# Patient Record
Sex: MALE | Race: WHITE | Employment: OTHER | URBAN - METROPOLITAN AREA
[De-identification: names, ages, dates, MRNs, and addresses within clinical notes are randomized per-mention and may not be internally consistent; named-entity substitution may affect disease eponyms.]

---

## 2019-05-27 ENCOUNTER — APPOINTMENT (OUTPATIENT)
Dept: GENERAL RADIOLOGY | Age: 69
End: 2019-05-27
Payer: MEDICARE

## 2019-05-27 ENCOUNTER — APPOINTMENT (OUTPATIENT)
Dept: CT IMAGING | Age: 69
End: 2019-05-27
Payer: MEDICARE

## 2019-05-27 ENCOUNTER — HOSPITAL ENCOUNTER (OUTPATIENT)
Age: 69
Setting detail: OBSERVATION
Discharge: HOME | End: 2019-05-28
Attending: STUDENT IN AN ORGANIZED HEALTH CARE EDUCATION/TRAINING PROGRAM | Admitting: INTERNAL MEDICINE
Payer: MEDICARE

## 2019-05-27 DIAGNOSIS — S30.0XXA CONTUSION OF BUTTOCK, INITIAL ENCOUNTER: ICD-10-CM

## 2019-05-27 DIAGNOSIS — J93.9 PNEUMOTHORAX ON RIGHT: Primary | ICD-10-CM

## 2019-05-27 DIAGNOSIS — S22.31XA CLOSED FRACTURE OF ONE RIB OF RIGHT SIDE, INITIAL ENCOUNTER: ICD-10-CM

## 2019-05-27 DIAGNOSIS — S22.41XA CLOSED FRACTURE OF MULTIPLE RIBS OF RIGHT SIDE, INITIAL ENCOUNTER: ICD-10-CM

## 2019-05-27 PROBLEM — I10 ESSENTIAL HYPERTENSION: Status: ACTIVE | Noted: 2019-05-27

## 2019-05-27 PROBLEM — E11.9 TYPE 2 DIABETES MELLITUS, WITHOUT LONG-TERM CURRENT USE OF INSULIN (HCC): Status: ACTIVE | Noted: 2019-05-27

## 2019-05-27 LAB
ABO/RH: NORMAL
ALBUMIN SERPL-MCNC: 4.4 G/DL (ref 3.5–4.6)
ALP BLD-CCNC: 79 U/L (ref 35–104)
ALT SERPL-CCNC: 98 U/L (ref 0–41)
AMPHETAMINE SCREEN, URINE: ABNORMAL
ANION GAP SERPL CALCULATED.3IONS-SCNC: 18 MEQ/L (ref 9–15)
ANISOCYTOSIS: ABNORMAL
ANTIBODY SCREEN: NORMAL
APTT: 19.7 SEC (ref 21.6–35.4)
AST SERPL-CCNC: 145 U/L (ref 0–40)
ATYPICAL LYMPHOCYTE RELATIVE PERCENT: 5 %
BACTERIA: NEGATIVE /HPF
BARBITURATE SCREEN URINE: ABNORMAL
BASOPHILS ABSOLUTE: 0 K/UL (ref 0–0.2)
BASOPHILS RELATIVE PERCENT: 0.8 %
BENZODIAZEPINE SCREEN, URINE: ABNORMAL
BILIRUB SERPL-MCNC: 1.3 MG/DL (ref 0.2–0.7)
BILIRUBIN URINE: NEGATIVE
BLOOD, URINE: ABNORMAL
BUN BLDV-MCNC: 18 MG/DL (ref 8–23)
CALCIUM SERPL-MCNC: 9 MG/DL (ref 8.5–9.9)
CANNABINOID SCREEN URINE: POSITIVE
CASTS: ABNORMAL /LPF
CHLORIDE BLD-SCNC: 100 MEQ/L (ref 95–107)
CLARITY: CLEAR
CO2: 24 MEQ/L (ref 20–31)
COCAINE METABOLITE SCREEN URINE: ABNORMAL
COLOR: ABNORMAL
CREAT SERPL-MCNC: 0.95 MG/DL (ref 0.7–1.2)
EKG ATRIAL RATE: 60 BPM
EKG P AXIS: -15 DEGREES
EKG P-R INTERVAL: 162 MS
EKG Q-T INTERVAL: 430 MS
EKG QRS DURATION: 104 MS
EKG QTC CALCULATION (BAZETT): 430 MS
EKG R AXIS: -6 DEGREES
EKG T AXIS: 30 DEGREES
EKG VENTRICULAR RATE: 60 BPM
EOSINOPHILS ABSOLUTE: 0.2 K/UL (ref 0–0.7)
EOSINOPHILS RELATIVE PERCENT: 2 %
EPITHELIAL CELLS, UA: ABNORMAL /HPF (ref 0–5)
ETHANOL PERCENT: NORMAL G/DL
ETHANOL: <10 MG/DL (ref 0–0.08)
GFR AFRICAN AMERICAN: >60
GFR NON-AFRICAN AMERICAN: >60
GLOBULIN: 2.3 G/DL (ref 2.3–3.5)
GLUCOSE BLD-MCNC: 148 MG/DL (ref 70–99)
GLUCOSE BLD-MCNC: 190 MG/DL (ref 60–115)
GLUCOSE URINE: NEGATIVE MG/DL
HCT VFR BLD CALC: 44.6 % (ref 42–52)
HEMOGLOBIN: 16.5 G/DL (ref 14–18)
INR BLD: 1.1
KETONES, URINE: ABNORMAL MG/DL
LEUKOCYTE ESTERASE, URINE: NEGATIVE
LYMPHOCYTES ABSOLUTE: 4 K/UL (ref 1–4.8)
LYMPHOCYTES RELATIVE PERCENT: 38 %
Lab: ABNORMAL
MCH RBC QN AUTO: 35.5 PG (ref 27–31.3)
MCHC RBC AUTO-ENTMCNC: 36.9 % (ref 33–37)
MCV RBC AUTO: 96.3 FL (ref 80–100)
MONOCYTES ABSOLUTE: 0.4 K/UL (ref 0.2–0.8)
MONOCYTES RELATIVE PERCENT: 3.7 %
NEUTROPHILS ABSOLUTE: 4.7 K/UL (ref 1.4–6.5)
NEUTROPHILS RELATIVE PERCENT: 51 %
NITRITE, URINE: NEGATIVE
OPIATE SCREEN URINE: POSITIVE
PDW BLD-RTO: 12.8 % (ref 11.5–14.5)
PERFORMED ON: ABNORMAL
PH UA: 5 (ref 5–9)
PHENCYCLIDINE SCREEN URINE: ABNORMAL
PLATELET # BLD: 295 K/UL (ref 130–400)
PLATELET SLIDE REVIEW: ADEQUATE
POC CREATININE WHOLE BLOOD: 1
POTASSIUM SERPL-SCNC: 3.7 MEQ/L (ref 3.4–4.9)
PROTEIN UA: ABNORMAL MG/DL
PROTHROMBIN TIME: 10.8 SEC (ref 9–11.5)
RBC # BLD: 4.63 M/UL (ref 4.7–6.1)
RBC UA: ABNORMAL /HPF (ref 0–5)
SLIDE REVIEW: ABNORMAL
SMUDGE CELLS: 1.9
SODIUM BLD-SCNC: 142 MEQ/L (ref 135–144)
SPECIFIC GRAVITY UA: 1.04 (ref 1–1.03)
TOTAL PROTEIN: 6.7 G/DL (ref 6.3–8)
URINE REFLEX TO CULTURE: YES
UROBILINOGEN, URINE: 1 E.U./DL
WBC # BLD: 9.3 K/UL (ref 4.8–10.8)
WBC UA: ABNORMAL /HPF (ref 0–5)

## 2019-05-27 PROCEDURE — 72131 CT LUMBAR SPINE W/O DYE: CPT

## 2019-05-27 PROCEDURE — 80053 COMPREHEN METABOLIC PANEL: CPT

## 2019-05-27 PROCEDURE — 87086 URINE CULTURE/COLONY COUNT: CPT

## 2019-05-27 PROCEDURE — 86900 BLOOD TYPING SEROLOGIC ABO: CPT

## 2019-05-27 PROCEDURE — 85610 PROTHROMBIN TIME: CPT

## 2019-05-27 PROCEDURE — 81001 URINALYSIS AUTO W/SCOPE: CPT

## 2019-05-27 PROCEDURE — 74177 CT ABD & PELVIS W/CONTRAST: CPT

## 2019-05-27 PROCEDURE — 71045 X-RAY EXAM CHEST 1 VIEW: CPT

## 2019-05-27 PROCEDURE — 6360000002 HC RX W HCPCS: Performed by: INTERNAL MEDICINE

## 2019-05-27 PROCEDURE — 80307 DRUG TEST PRSMV CHEM ANLYZR: CPT

## 2019-05-27 PROCEDURE — G0480 DRUG TEST DEF 1-7 CLASSES: HCPCS

## 2019-05-27 PROCEDURE — 70450 CT HEAD/BRAIN W/O DYE: CPT

## 2019-05-27 PROCEDURE — 6360000002 HC RX W HCPCS: Performed by: PHYSICIAN ASSISTANT

## 2019-05-27 PROCEDURE — 72170 X-RAY EXAM OF PELVIS: CPT

## 2019-05-27 PROCEDURE — G0378 HOSPITAL OBSERVATION PER HR: HCPCS

## 2019-05-27 PROCEDURE — 72125 CT NECK SPINE W/O DYE: CPT

## 2019-05-27 PROCEDURE — 86901 BLOOD TYPING SEROLOGIC RH(D): CPT

## 2019-05-27 PROCEDURE — 96376 TX/PRO/DX INJ SAME DRUG ADON: CPT

## 2019-05-27 PROCEDURE — 85730 THROMBOPLASTIN TIME PARTIAL: CPT

## 2019-05-27 PROCEDURE — 96375 TX/PRO/DX INJ NEW DRUG ADDON: CPT

## 2019-05-27 PROCEDURE — 6370000000 HC RX 637 (ALT 250 FOR IP): Performed by: INTERNAL MEDICINE

## 2019-05-27 PROCEDURE — 2580000003 HC RX 258: Performed by: INTERNAL MEDICINE

## 2019-05-27 PROCEDURE — 6360000004 HC RX CONTRAST MEDICATION: Performed by: PHYSICIAN ASSISTANT

## 2019-05-27 PROCEDURE — 94150 VITAL CAPACITY TEST: CPT

## 2019-05-27 PROCEDURE — 86850 RBC ANTIBODY SCREEN: CPT

## 2019-05-27 PROCEDURE — 93005 ELECTROCARDIOGRAM TRACING: CPT | Performed by: STUDENT IN AN ORGANIZED HEALTH CARE EDUCATION/TRAINING PROGRAM

## 2019-05-27 PROCEDURE — 36415 COLL VENOUS BLD VENIPUNCTURE: CPT

## 2019-05-27 PROCEDURE — 85025 COMPLETE CBC W/AUTO DIFF WBC: CPT

## 2019-05-27 PROCEDURE — 71260 CT THORAX DX C+: CPT

## 2019-05-27 PROCEDURE — 99285 EMERGENCY DEPT VISIT HI MDM: CPT

## 2019-05-27 PROCEDURE — 96374 THER/PROPH/DIAG INJ IV PUSH: CPT

## 2019-05-27 RX ORDER — PANTOPRAZOLE SODIUM 40 MG/1
40 TABLET, DELAYED RELEASE ORAL DAILY
Status: DISCONTINUED | OUTPATIENT
Start: 2019-05-27 | End: 2019-05-28 | Stop reason: HOSPADM

## 2019-05-27 RX ORDER — FENTANYL CITRATE 50 UG/ML
50 INJECTION, SOLUTION INTRAMUSCULAR; INTRAVENOUS ONCE
Status: COMPLETED | OUTPATIENT
Start: 2019-05-27 | End: 2019-05-27

## 2019-05-27 RX ORDER — VALSARTAN 160 MG/1
80 TABLET ORAL DAILY
Status: DISCONTINUED | OUTPATIENT
Start: 2019-05-28 | End: 2019-05-28 | Stop reason: HOSPADM

## 2019-05-27 RX ORDER — HYDRALAZINE HYDROCHLORIDE 20 MG/ML
10 INJECTION INTRAMUSCULAR; INTRAVENOUS EVERY 4 HOURS PRN
Status: DISCONTINUED | OUTPATIENT
Start: 2019-05-27 | End: 2019-05-28 | Stop reason: HOSPADM

## 2019-05-27 RX ORDER — DEXTROSE MONOHYDRATE 50 MG/ML
100 INJECTION, SOLUTION INTRAVENOUS PRN
Status: DISCONTINUED | OUTPATIENT
Start: 2019-05-27 | End: 2019-05-28 | Stop reason: HOSPADM

## 2019-05-27 RX ORDER — SODIUM CHLORIDE 0.9 % (FLUSH) 0.9 %
10 SYRINGE (ML) INJECTION PRN
Status: DISCONTINUED | OUTPATIENT
Start: 2019-05-27 | End: 2019-05-28 | Stop reason: HOSPADM

## 2019-05-27 RX ORDER — NICOTINE POLACRILEX 4 MG
15 LOZENGE BUCCAL PRN
Status: DISCONTINUED | OUTPATIENT
Start: 2019-05-27 | End: 2019-05-28 | Stop reason: HOSPADM

## 2019-05-27 RX ORDER — SODIUM CHLORIDE 0.9 % (FLUSH) 0.9 %
10 SYRINGE (ML) INJECTION EVERY 12 HOURS SCHEDULED
Status: DISCONTINUED | OUTPATIENT
Start: 2019-05-27 | End: 2019-05-28 | Stop reason: HOSPADM

## 2019-05-27 RX ORDER — KETOROLAC TROMETHAMINE 30 MG/ML
30 INJECTION, SOLUTION INTRAMUSCULAR; INTRAVENOUS EVERY 6 HOURS
Status: DISCONTINUED | OUTPATIENT
Start: 2019-05-27 | End: 2019-05-28 | Stop reason: HOSPADM

## 2019-05-27 RX ORDER — ONDANSETRON 2 MG/ML
4 INJECTION INTRAMUSCULAR; INTRAVENOUS EVERY 6 HOURS PRN
Status: DISCONTINUED | OUTPATIENT
Start: 2019-05-27 | End: 2019-05-28 | Stop reason: HOSPADM

## 2019-05-27 RX ORDER — DEXTROSE MONOHYDRATE 25 G/50ML
12.5 INJECTION, SOLUTION INTRAVENOUS PRN
Status: DISCONTINUED | OUTPATIENT
Start: 2019-05-27 | End: 2019-05-28 | Stop reason: HOSPADM

## 2019-05-27 RX ORDER — ACETAMINOPHEN 325 MG/1
650 TABLET ORAL EVERY 6 HOURS SCHEDULED
Status: DISCONTINUED | OUTPATIENT
Start: 2019-05-27 | End: 2019-05-28 | Stop reason: HOSPADM

## 2019-05-27 RX ORDER — SODIUM CHLORIDE 9 MG/ML
INJECTION, SOLUTION INTRAVENOUS CONTINUOUS
Status: DISPENSED | OUTPATIENT
Start: 2019-05-27 | End: 2019-05-28

## 2019-05-27 RX ORDER — MORPHINE SULFATE 2 MG/ML
2 INJECTION, SOLUTION INTRAMUSCULAR; INTRAVENOUS EVERY 4 HOURS PRN
Status: DISCONTINUED | OUTPATIENT
Start: 2019-05-27 | End: 2019-05-28 | Stop reason: HOSPADM

## 2019-05-27 RX ORDER — ONDANSETRON 2 MG/ML
4 INJECTION INTRAMUSCULAR; INTRAVENOUS ONCE
Status: COMPLETED | OUTPATIENT
Start: 2019-05-27 | End: 2019-05-27

## 2019-05-27 RX ORDER — MORPHINE SULFATE 2 MG/ML
4 INJECTION, SOLUTION INTRAMUSCULAR; INTRAVENOUS ONCE
Status: COMPLETED | OUTPATIENT
Start: 2019-05-27 | End: 2019-05-27

## 2019-05-27 RX ORDER — OXYCODONE HYDROCHLORIDE AND ACETAMINOPHEN 5; 325 MG/1; MG/1
1 TABLET ORAL EVERY 6 HOURS PRN
Status: DISCONTINUED | OUTPATIENT
Start: 2019-05-27 | End: 2019-05-28 | Stop reason: HOSPADM

## 2019-05-27 RX ADMIN — KETOROLAC TROMETHAMINE 30 MG: 30 INJECTION, SOLUTION INTRAMUSCULAR at 19:37

## 2019-05-27 RX ADMIN — PANTOPRAZOLE SODIUM 40 MG: 40 TABLET, DELAYED RELEASE ORAL at 19:38

## 2019-05-27 RX ADMIN — SODIUM CHLORIDE: 9 INJECTION, SOLUTION INTRAVENOUS at 19:38

## 2019-05-27 RX ADMIN — IOPAMIDOL 100 ML: 755 INJECTION, SOLUTION INTRAVENOUS at 15:59

## 2019-05-27 RX ADMIN — Medication 10 ML: at 19:38

## 2019-05-27 RX ADMIN — ACETAMINOPHEN 650 MG: 325 TABLET ORAL at 22:09

## 2019-05-27 RX ADMIN — FENTANYL CITRATE 50 MCG: 50 INJECTION, SOLUTION INTRAMUSCULAR; INTRAVENOUS at 17:39

## 2019-05-27 RX ADMIN — ONDANSETRON 4 MG: 2 INJECTION INTRAMUSCULAR; INTRAVENOUS at 15:17

## 2019-05-27 RX ADMIN — MORPHINE SULFATE 4 MG: 2 INJECTION, SOLUTION INTRAMUSCULAR; INTRAVENOUS at 15:18

## 2019-05-27 ASSESSMENT — PAIN DESCRIPTION - LOCATION
LOCATION: BACK
LOCATION: BACK

## 2019-05-27 ASSESSMENT — ENCOUNTER SYMPTOMS
SHORTNESS OF BREATH: 0
SORE THROAT: 0
RHINORRHEA: 0
EYE DISCHARGE: 0
COLOR CHANGE: 0
CONSTIPATION: 0
ABDOMINAL PAIN: 0
ABDOMINAL DISTENTION: 0

## 2019-05-27 ASSESSMENT — PAIN DESCRIPTION - DESCRIPTORS
DESCRIPTORS: SHARP;STABBING
DESCRIPTORS: SHARP

## 2019-05-27 ASSESSMENT — PAIN SCALES - GENERAL
PAINLEVEL_OUTOF10: 9
PAINLEVEL_OUTOF10: 7
PAINLEVEL_OUTOF10: 5
PAINLEVEL_OUTOF10: 10

## 2019-05-27 ASSESSMENT — PAIN DESCRIPTION - PAIN TYPE
TYPE: ACUTE PAIN
TYPE: ACUTE PAIN

## 2019-05-27 ASSESSMENT — PAIN DESCRIPTION - ORIENTATION
ORIENTATION: RIGHT;POSTERIOR;MID
ORIENTATION: RIGHT;MID

## 2019-05-27 ASSESSMENT — PAIN DESCRIPTION - FREQUENCY: FREQUENCY: CONTINUOUS

## 2019-05-27 NOTE — ED NOTES
To CT/xray via cart. States pain is a little better since the Morphine. No acute distress noted at this time.      Fermín Lynch RN  05/27/19 0543

## 2019-05-27 NOTE — ED NOTES
Transport here. Pt states he's feeling better. No acute distress noted at this time. Tele removed after confirming that no tele was needed for the floor.       Kelsey Draper RN  05/27/19 1174

## 2019-05-27 NOTE — ED NOTES
Bed: 01  Expected date:   Expected time:   Means of arrival:   Comments:     Hedy Poole RN  05/27/19 5748

## 2019-05-27 NOTE — ED NOTES
Returned. Placed back onto monitor. States pain only with each breath to right side of back. States it feels like he can't relax. O2 2 liters intact.       Tyrone Bishop RN  05/27/19 1664

## 2019-05-27 NOTE — ED PROVIDER NOTES
[address] [facility] ED  eMERGENCY dEPARTMENT eNCOUnter      Pt Name: Alejandrina Galloway  MRN: [id number]  [place] 1950  Date of evaluation: 5/27/2019  Provider: Kel Edwards PA-C    CHIEF COMPLAINT       Chief Complaint   Patient presents with   Wannetta Parlor from 8-10 feet up when ladder slipped out from under him         HISTORY OF PRESENT ILLNESS   (Location/Symptom, Timing/Onset,Context/Setting, Quality, Duration, Modifying Factors, Severity)  Note limiting factors. Alejnadrina Galloway is a 76 y.o. male who presents to the emergency department with a complaint of right-sided lower chest wall pain neck pain secondary to a fall approximately 8 feet off of the ladder. Patient states ladder was leaned up against the wall he states he kicked out from underneath him he fell backwards and landed on the ladder injuring the right side of his chest wall. He states increasing chest pain with deep inspiration and some shortness of breath. Consciousness no nausea vomiting or dizziness. He rates his current pain at this time as a 10 out of 10 daily to his right side chest wall. He denies any pelvic pain he didn't denies any upper or lower extremity pain. Patient states past medical history for hypertension and diabetes mellitus E is not on any blood thinners according to patient. HPI    NursingNotes were reviewed. REVIEW OF SYSTEMS    (2-9 systems for level 4, 10 or more for level 5)     Review of Systems   Constitutional: Negative for activity change and appetite change. HENT: Negative for congestion, ear discharge, ear pain, nosebleeds, rhinorrhea and sore throat. Eyes: Negative for discharge. Respiratory: Negative for shortness of breath. Into the right lower posterior chest wall. Cardiovascular: Negative for chest pain, palpitations and leg swelling. Gastrointestinal: Negative for abdominal distention, abdominal pain and constipation.    Genitourinary: Negative for difficulty urinating and dysuria. Musculoskeletal: Positive for neck pain. Negative for arthralgias. Skin: Negative for color change, pallor, rash and wound. Neurological: Negative for dizziness, tremors, syncope, weakness, numbness and headaches. Psychiatric/Behavioral: Negative for agitation and confusion. Except as noted above the remainder of the review of systems was reviewed and negative. PAST MEDICAL HISTORY     Past Medical History:   Diagnosis Date    Acid reflux     Diabetes mellitus ([address]     Hypertension     Prostatitis          SURGICALHISTORY       Past Surgical History:   Procedure Laterality Date    CHOLECYSTECTOMY, LAPAROSCOPIC N/A 12/9/2016    CHOLECYSTECTOMY LAPAROSCOPIC WITH GRAMS POSS OPEN  performed by Cathie Payton MD at [address] [facility]      ganglion cyst removal of right wrist    TONSILLECTOMY           CURRENT MEDICATIONS       Previous Medications    ESOMEPRAZOLE MAGNESIUM (NEXIUM) 40 MG PACK    Take 40 mg by mouth daily    METFORMIN (GLUCOPHAGE) 500 MG TABLET    Take 500 mg by mouth daily (with breakfast)    VALSARTAN (DIOVAN) 80 MG TABLET    Take 80 mg by mouth daily       ALLERGIES     Patient has no known allergies. FAMILY HISTORY     History reviewed. No pertinent family history. SOCIAL HISTORY       Social History     Socioeconomic History    Marital status:      Spouse name: None    Number of children: None    Years of education: None    Highest education level: None   Occupational History    None   Social Needs    Financial resource strain: None    Food insecurity:     Worry: None     Inability: None    Transportation needs:     Medical: None     Non-medical: None   Tobacco Use    Smoking status: Light Tobacco Smoker     Types: Cigars    Smokeless tobacco: Never Used    Tobacco comment: occassional cigar   Substance and Sexual Activity    Alcohol use: Yes     Comment: Switches between a beer a day and an NA a day    Drug use:  Yes lobes left side. Minimally diminished lung sounds right lower lobe. Positive pain on palpation to the lateral aspect of the right chest wall in the area of the seventh or eighth ribs crepitus or deformity no depressions minor abrasion. Abdominal: Soft. Bowel sounds are normal. He exhibits no distension and no mass. There is no tenderness. There is no guarding. Abdomen soft nondistended nontender no guarding mass or rebound, no cuts scrapes or abrasions noted to abdomen   Musculoskeletal: He exhibits no edema or deformity. Pelvis is stable there is no crepitus or deformity no pain on palpation no shortening or rotation of either lower extremity no pain on palpation to right or left lower extremity moving all extremities upper and lower well without any difficulty. Neurological: He is alert and oriented to person, place, and time. Coordination normal.   Skin: Skin is warm. DIAGNOSTIC RESULTS     EKG: All EKG's are interpreted by the Emergency Department Physician who either signs or Co-signsthis chart in the absence of a cardiologist.    EKG shows normal sinus rhythm at 60 bpm there is T-wave inversions in leads 3 no other acute ST segment abnormality no ventricular ectopy QT is 430 ms    RADIOLOGY:   Non-plain filmimages such as CT, Ultrasound and MRI are read by the radiologist. Plain radiographic images are visualized and preliminarily interpreted by the emergency physician with the below findings:    Chest x-ray shows no signs of pneumothorax there is a fracture to the seventh rib lateral right side. X-ray pelvis shows no acute fracture. No subluxations of hips. CT scan chest shows nondisplaced fractures of the right seventh and ninth ribs trace right inferior medial pneumothorax    CT of the lumbar spine shows spondylosis  L4-L5 L stenosis negative for fractures.     CT scan abdomen and pelvis shows right gluteal contusion otherwise no significant traumatic pathology    CT of the cervical spine is negative for acute fracture    CT of the brain is negative for acute intracranial pathology    Interpretation per the Radiologist below, if available at the time ofthis note:    CT ABDOMEN PELVIS W IV CONTRAST Additional Contrast? None   Final Result   RIGHT GLUTEAL CONTUSION. OTHERWISE, NO SIGNIFICANT TRAUMATIC PATHOLOGY IDENTIFIED. CT CHEST W CONTRAST   Final Result   NONDISPLACED FRACTURES OF THE RIGHT 7TH AND 9TH RIBS. TRACE RIGHT PNEUMOTHORAX. CT Head WO Contrast   Final Result   NO ACUTE INTRACRANIAL PATHOLOGY. CT CERVICAL SPINE WO CONTRAST   Final Result   SPONDYLOSIS. NEGATIVE FOR FRACTURE. CT LUMBAR SPINE WO CONTRAST   Final Result   SPONDYLOSIS WITH L4-5 CANAL STENOSIS. NEGATIVE FOR FRACTURE.       XR CHEST PORTABLE    (Results Pending)   XR PELVIS (1-2 VIEWS)    (Results Pending)         ED BEDSIDE ULTRASOUND:   Performed by ED Physician - none    LABS:  Labs Reviewed   COMPREHENSIVE METABOLIC PANEL - Abnormal; Notable for the following components:       Result Value    Anion Gap 18 (*)     Glucose 148 (*)     Total Bilirubin 1.3 (*)     ALT 98 (*)      (*)     All other components within normal limits   CBC WITH AUTO DIFFERENTIAL - Abnormal; Notable for the following components:    RBC 4.63 (*)     MCH 35.5 (*)     All other components within normal limits   URINE DRUG SCREEN - Abnormal; Notable for the following components:    Cannabinoid Scrn, Ur POSITIVE (*)     Opiate Scrn, Ur POSITIVE (*)     All other components within normal limits   APTT - Abnormal; Notable for the following components:    aPTT 19.7 (*)     All other components within normal limits   URINE RT REFLEX TO CULTURE - Abnormal; Notable for the following components:    Color, UA DARK YELLOW (*)     Ketones, Urine TRACE (*)     Blood, Urine MODERATE (*)     Protein, UA TRACE (*)     All other components within normal limits   MICROSCOPIC URINALYSIS - Abnormal; Notable for the following components: WBC, UA 6-10 (*)     RBC, UA 20-50 (*)     All other components within normal limits   POCT CREATININE - Normal   URINE CULTURE   ETHANOL   PROTIME-INR   TYPE AND SCREEN       All other labs were within normal range or not returned as of this dictation. EMERGENCY DEPARTMENT COURSE and DIFFERENTIAL DIAGNOSIS/MDM:   Vitals:    Vitals:    05/27/19 1510 05/27/19 1530 05/27/19 1604 05/27/19 1645   BP:  135/77 113/79 116/72   Pulse:  65 74 66   Resp:  18 18 16   Temp:       TempSrc:       SpO2:  99% 100% 100%   Weight: 180 lb (81.6 kg)      Height: 5' 6\" (1.676 m)                   MDM  Number of Diagnoses or Management Options  Closed fracture of multiple ribs of right side, initial encounter:   Contusion of buttock, initial encounter:   Pneumothorax on right:   Diagnosis management comments: Patient was seen by myself as well as Dr. Ron Gallagher. Patient remains alert and oriented during his entire visit here in the department he remains hemodynamically stable. He complains of pain to his right ribs posteriorly with fractures to the seventh and ninth ribs. Trace pneumothorax that is seen only on CT scan of the chest not visible on regular plain films. The remaining CT scans of the head and cervical spine and lumbar spine are negative for acute fractures. Pelvis shows no acute fractures. due to multiple rib fractures and trace pneumothorax patient will be admitted to hospital under the [facility] hospitalist Dr. Valentina Joens for further evaluation and management,with consults to pulmonology. CRITICAL CARE TIME   Total Critical Care time was 0 minutes, excluding separately reportableprocedures. There was a high probability of clinicallysignificant/life threatening deterioration in the patient's condition which required my urgent intervention. CONSULTS:  IP CONSULT TO HOSPITALIST    PROCEDURES:  Unless otherwise noted below, none     Procedures    FINAL IMPRESSION      1.  Pneumothorax on right    2. Closed fracture of multiple ribs of right side, initial encounter    3.  Contusion of buttock, initial encounter          DISPOSITION/PLAN   DISPOSITION Admitted 05/27/2019 05:12:42 PM      PATIENT REFERRED TO:  Portillo Cronin MD  [address] [place], [place] [id number] [phone]            DISCHARGE MEDICATIONS:  New Prescriptions    No medications on file          (Please note that portions of this note were completed with a voice recognition program.  Efforts were made to edit the dictations but occasionally words are mis-transcribed.)    Donn Linder PA-C (electronically signed)  Attending Emergency Physician         Donn Linder PA-C  05/27/19 [address] PA-C  05/27/19 [address] PA-C  05/28/19 5942

## 2019-05-27 NOTE — ED NOTES
Bed: 07  Expected date:   Expected time:   Means of arrival:   Comments:     Cayla Tan RN  05/27/19 3254

## 2019-05-28 ENCOUNTER — APPOINTMENT (OUTPATIENT)
Dept: GENERAL RADIOLOGY | Age: 69
End: 2019-05-28
Payer: MEDICARE

## 2019-05-28 VITALS
RESPIRATION RATE: 8 BRPM | OXYGEN SATURATION: 95 % | BODY MASS INDEX: 28.93 KG/M2 | HEART RATE: 69 BPM | HEIGHT: 66 IN | SYSTOLIC BLOOD PRESSURE: 122 MMHG | TEMPERATURE: 98.1 F | DIASTOLIC BLOOD PRESSURE: 72 MMHG | WEIGHT: 180 LBS

## 2019-05-28 LAB
ANION GAP SERPL CALCULATED.3IONS-SCNC: 10 MEQ/L (ref 9–15)
BASOPHILS ABSOLUTE: 0 K/UL (ref 0–0.2)
BASOPHILS RELATIVE PERCENT: 0.5 %
BUN BLDV-MCNC: 21 MG/DL (ref 8–23)
CALCIUM SERPL-MCNC: 8.8 MG/DL (ref 8.5–9.9)
CHLORIDE BLD-SCNC: 105 MEQ/L (ref 95–107)
CO2: 25 MEQ/L (ref 20–31)
CREAT SERPL-MCNC: 0.8 MG/DL (ref 0.7–1.2)
EOSINOPHILS ABSOLUTE: 0.1 K/UL (ref 0–0.7)
EOSINOPHILS RELATIVE PERCENT: 0.8 %
GFR AFRICAN AMERICAN: >60
GFR NON-AFRICAN AMERICAN: >60
GLUCOSE BLD-MCNC: 142 MG/DL (ref 70–99)
HCT VFR BLD CALC: 40.7 % (ref 42–52)
HEMOGLOBIN: 14 G/DL (ref 14–18)
LYMPHOCYTES ABSOLUTE: 2.2 K/UL (ref 1–4.8)
LYMPHOCYTES RELATIVE PERCENT: 27.6 %
MCH RBC QN AUTO: 33.6 PG (ref 27–31.3)
MCHC RBC AUTO-ENTMCNC: 34.4 % (ref 33–37)
MCV RBC AUTO: 97.6 FL (ref 80–100)
MONOCYTES ABSOLUTE: 1.1 K/UL (ref 0.2–0.8)
MONOCYTES RELATIVE PERCENT: 13.8 %
NEUTROPHILS ABSOLUTE: 4.7 K/UL (ref 1.4–6.5)
NEUTROPHILS RELATIVE PERCENT: 57.3 %
PDW BLD-RTO: 12.9 % (ref 11.5–14.5)
PLATELET # BLD: 213 K/UL (ref 130–400)
POTASSIUM REFLEX MAGNESIUM: 4.2 MEQ/L (ref 3.4–4.9)
RBC # BLD: 4.17 M/UL (ref 4.7–6.1)
SODIUM BLD-SCNC: 140 MEQ/L (ref 135–144)
WBC # BLD: 8.1 K/UL (ref 4.8–10.8)

## 2019-05-28 PROCEDURE — G0378 HOSPITAL OBSERVATION PER HR: HCPCS

## 2019-05-28 PROCEDURE — 36415 COLL VENOUS BLD VENIPUNCTURE: CPT

## 2019-05-28 PROCEDURE — 6360000002 HC RX W HCPCS: Performed by: INTERNAL MEDICINE

## 2019-05-28 PROCEDURE — 71045 X-RAY EXAM CHEST 1 VIEW: CPT

## 2019-05-28 PROCEDURE — 71046 X-RAY EXAM CHEST 2 VIEWS: CPT

## 2019-05-28 PROCEDURE — 99222 1ST HOSP IP/OBS MODERATE 55: CPT | Performed by: INTERNAL MEDICINE

## 2019-05-28 PROCEDURE — 6370000000 HC RX 637 (ALT 250 FOR IP): Performed by: INTERNAL MEDICINE

## 2019-05-28 PROCEDURE — 93010 ELECTROCARDIOGRAM REPORT: CPT | Performed by: INTERNAL MEDICINE

## 2019-05-28 PROCEDURE — 80048 BASIC METABOLIC PNL TOTAL CA: CPT

## 2019-05-28 PROCEDURE — 85025 COMPLETE CBC W/AUTO DIFF WBC: CPT

## 2019-05-28 PROCEDURE — 2700000000 HC OXYGEN THERAPY PER DAY

## 2019-05-28 RX ORDER — TRAMADOL HYDROCHLORIDE 50 MG/1
50 TABLET ORAL EVERY 6 HOURS PRN
Qty: 10 TABLET | Refills: 0 | Status: SHIPPED | OUTPATIENT
Start: 2019-05-28 | End: 2019-05-31

## 2019-05-28 RX ORDER — ACETAMINOPHEN 500 MG
500 TABLET ORAL EVERY 8 HOURS
Qty: 15 TABLET | Refills: 0 | COMMUNITY
Start: 2019-05-28 | End: 2019-06-02

## 2019-05-28 RX ORDER — LIDOCAINE HYDROCHLORIDE 20 MG/ML
INJECTION, SOLUTION INFILTRATION; PERINEURAL
Status: DISCONTINUED
Start: 2019-05-28 | End: 2019-05-28 | Stop reason: HOSPADM

## 2019-05-28 RX ADMIN — PANTOPRAZOLE SODIUM 40 MG: 40 TABLET, DELAYED RELEASE ORAL at 09:45

## 2019-05-28 RX ADMIN — ACETAMINOPHEN 650 MG: 325 TABLET ORAL at 09:45

## 2019-05-28 RX ADMIN — VALSARTAN 80 MG: 160 TABLET, FILM COATED ORAL at 09:45

## 2019-05-28 RX ADMIN — KETOROLAC TROMETHAMINE 30 MG: 30 INJECTION, SOLUTION INTRAMUSCULAR at 01:27

## 2019-05-28 RX ADMIN — KETOROLAC TROMETHAMINE 30 MG: 30 INJECTION, SOLUTION INTRAMUSCULAR at 08:04

## 2019-05-28 RX ADMIN — ACETAMINOPHEN 650 MG: 325 TABLET ORAL at 04:36

## 2019-05-28 ASSESSMENT — PAIN SCALES - GENERAL
PAINLEVEL_OUTOF10: 8
PAINLEVEL_OUTOF10: 8
PAINLEVEL_OUTOF10: 7
PAINLEVEL_OUTOF10: 0
PAINLEVEL_OUTOF10: 6
PAINLEVEL_OUTOF10: 4

## 2019-05-28 NOTE — DISCHARGE SUMMARY
Physician Discharge Summary     Patient ID:  Momo Stoner  [id number]  43 y.o.  1950    Admit date: 5/27/2019    Discharge date and time:5/28/2019  2:06 PM    Admitting Physician: Kameron Soares MD     Discharge Physician: Kameron Soares MD    Admission Diagnoses: Closed fracture of rib of right side [S22.31XA]  Closed fracture of rib of right side [S22.31XA]    Discharge Diagnoses: Active Hospital Problems    Diagnosis Date Noted    Closed fracture of rib of right side [S22.31XA] 05/27/2019    Type 2 diabetes mellitus, without long-term current use of insulin ([facility] [address] [E11.9] 05/27/2019    Essential hypertension [I10] 05/27/2019       Admission Condition: fair    Discharged Condition: good    Indication for Admission: fall    Hospital Course: Patient was admitted, after he fell from ladder 8 feet high. He was diagnosed with right rib fractures and trace right pneumothorax. He was admitted and observed overnight . The patient was discharged in stable condition    Inpatient meds:  lidocaine, , ,     pantoprazole, 40 mg, Oral, Daily    sodium chloride flush, 10 mL, Intravenous, 2 times per day    acetaminophen, 650 mg, Oral, 4 times per day    ketorolac, 30 mg, Intravenous, Q6H    valsartan, 80 mg, Oral, Daily    insulin lispro, 0-6 Units, Subcutaneous, TID WC    insulin lispro, 0-3 Units, Subcutaneous, Nightly    pneumococcal 13-valent conjugate, 0.5 mL, Intramuscular, Once    Labs:  LABS  Recent Labs     05/27/19  1530 05/28/19  0501   WBC 9.3 8.1   RBC 4.63* 4.17*   HGB 16.5 14.0   HCT 44.6 40.7*   MCV 96.3 97.6   MCH 35.5* 33.6*   MCHC 36.9 34.4   RDW 12.8 12.9    213       Recent Labs     05/27/19  1530 05/28/19  0501    140   K 3.7 4.2    105   CO2 24 25   BUN 18 21   CREATININE 0.95 0.80   GLUCOSE 148* 142*   CALCIUM 9.0 8.8       No results for input(s): MG in the last 72 hours.       Imaging: Xr Pelvis (1-2 Views)    Result Date: 5/28/2019  EXAMINATION: XR PELVIS (1-2 VIEWS): DATE AND TIME: 5/27/2019 at 3:30 PM. CLINICAL HISTORY: ACUTE PELVIC PAIN.  8 FT FALL. COMPARISON: None available. FINDINGS: AP view of the pelvis reveals no evidence for an acute fracture or other significant bone abnormality. As visualized, the sacroiliac joints are unremarkable. NEGATIVE PELVIS. Ct Head Wo Contrast    Result Date: 5/27/2019  EXAMINATION: CT BRAIN WITHOUT CONTRAST CLINICAL HISTORY:  8 ft fall COMPARISONS: NONE AVAILABLE TECHNIQUE: Spiral scans without contrast. Multiplanar 2-D reconstructions. All CT scans at this facility use dose modulation, iterative reconstruction, and/or weight based dosing when appropriate to reduce radiation dose to as low as reasonably achievable. FINDINGS: The brain appears normal. There are no fractures or other skull lesions. The mastoids and middle ears are clear. There is 1.5 cm retention cyst or polyp in the floor of the right maxillary sinus. There is trace polypoid mucoperiosteal thickening in the floor of the left maxillary  sinus. The paranasal sinuses are otherwise clear. The orbits are unremarkable. There is bilateral karla bullosa. NO ACUTE INTRACRANIAL PATHOLOGY. Ct Chest W Contrast    Result Date: 5/27/2019  EXAMINATION: CT CHEST W CONTRAST CLINICAL HISTORY:  blunt trauma   Fall COMPARISON: NONE AVAILABLE TECHNIQUE:  Spiral scans with bolus bolus administration of intravenous contrast. (100 ml Isovue-370). Multiplanar 2-D reconstructions. 3 x 10 mm axial 3-D maximum intensity projection reconstructions. All CT scans at this facility use dose modulation, iterative reconstruction, and/or weight based dosing when appropriate to reduce radiation dose to as low as reasonably achievable. FINDINGS: Acquisition was performed in shallow inspiration. There are nondisplaced fractures of the lateral right 7th and posterior right 9th ribs with trace concomitant extrapleural soft tissue swelling. There is trace inferomedial right pneumothorax.  There is bilateral dependent lung atelectasis. There is mediastinal lipomatosis. Vascular structures are intact. There is no pericardial effusion. The esophagus is unremarkable. There is mild spondylosis. NONDISPLACED FRACTURES OF THE RIGHT 7TH AND 9TH RIBS. TRACE RIGHT PNEUMOTHORAX. Ct Cervical Spine Wo Contrast    Result Date: 5/27/2019  EXAMINATION: CT CERVICAL SPINE WO CONTRAST CLINICAL HISTORY:  fall from ladder 8 ft COMPARISONS: NONE AVAILABLE TECHNIQUE: Spiral scans without contrast. Multiplanar 2-D reconstructions. All CT scans at this facility use dose modulation, iterative reconstruction, and/or weight based dosing when appropriate to reduce radiation dose to as low as reasonably achievable. FINDINGS: There is no fracture or traumatic subluxation. There is spondylosis with multilevel marginal osteophytes, uncovertebral osteoarthrosis, and apophyseal osteoarthrosis. There is moderate narrowing of the C5-6 intervertebral disc space. There is mild narrowing of the C4-5 and C6-7 intervertebral disc spaces. There is spur encroachment on the C5-6 neural foramina. There is moderate central canal stenosis and a left paramedian disc osteophyte complex at C5-6. SPONDYLOSIS. NEGATIVE FOR FRACTURE. Ct Lumbar Spine Wo Contrast    Result Date: 5/27/2019  EXAMINATION: CT LUMBAR SPINE WO CONTRAST CLINICAL HISTORY:  fall 8 ft off ladder   Fall COMPARISON: NONE AVAILABLE TECHNIQUE:  Spiral scans with bolus bolus administration of intravenous contrast. (The contrast was administered for the CT scans of the chest, abdomen, and pelvis). Multiplanar 2-D reconstructions. All CT scans at this facility use dose modulation, iterative reconstruction, and/or weight based dosing when appropriate to reduce radiation dose to as low as reasonably achievable. FINDINGS: No fracture is identified. There is mild lumbar spondylosis with multilevel apophyseal arthrosis and vertebral marginal osteophytosis.  There is moderate to marked L4-5 canal stenosis. SPONDYLOSIS WITH L4-5 CANAL STENOSIS. NEGATIVE FOR FRACTURE. Ct Abdomen Pelvis W Iv Contrast Additional Contrast? None    Result Date: 5/27/2019  EXAMINATION: CT ABDOMEN PELVIS W IV CONTRAST CLINICAL HISTORY:  fall 8 ft off ladder   Fall COMPARISON: NONE AVAILABLE TECHNIQUE:  Spiral scans with bolus bolus administration of intravenous contrast. (100 ml Isovue-370). Multiplanar 2-D reconstructions. All CT scans at this facility use dose modulation, iterative reconstruction, and/or weight based dosing when appropriate to reduce radiation dose to as low as reasonably achievable. FINDINGS: No solid or hollow organ injury is identified. Vascular structures are intact. No fractures identified. There is subcutaneous stranding in the right buttock consistent with soft tissue contusion. There is no hemoperitoneum. There is no evidence  of retroperitoneal/extraperitoneal hemorrhage. There is hepatic steatosis. Patient is status post cholecystectomy. The bile ducts and pancreas are unremarkable. There is no urolithiasis. There is symmetric bilateral renal contrast excretion without obstructive uropathy or extravasation. The prostate is markedly enlarged. The bowel is nonspecific. The appendix is normal. There is mild sigmoid diverticulosis. There are small fat-containing inguinal hernias. There is small fat-containing umbilical hernia. There are degenerative changes in the spine. Note: This interpretation includes assessment of the liver, spleen, gallbladder, bile. Ducts, pancreas, adrenal glands, kidneys, urogenital structures, abdominal vasculature, retroperitoneum, gastrointestinal tract, mesentery, lymph nodes, inguinal regions, osseous structures, abdominopelvic walls, and visualized portions of the lower thorax. Some structures may be congenitally or surgically absent/altered.   Unless otherwise indicated in this report, these organs and structures demonstrate no significant pathology or demonstrate findings which do not require additional follow-up/evaluation. RIGHT GLUTEAL CONTUSION. OTHERWISE, NO SIGNIFICANT TRAUMATIC PATHOLOGY IDENTIFIED. Xr Chest Portable    Result Date: 5/28/2019  EXAMINATION: CHEST PORTABLE VIEW  CLINICAL HISTORY: Generalized pain after fall COMPARISONS: December 5, 2016  FINDINGS: Single  views of the chest is submitted. Limited exam due to low lung volume The cardiac silhouette is enlarged. .  The mediastinum is unremarkable. Pulmonary vascular unremarkable. Right sided trachea. No focal infiltrates. Trace pneumothorax right report from CT scan of chest Fracture lateral aspect right seventh rib                                                                                   NO ACUTE ACTIVE CARDIOPULMONARY PROCESS. Fracture lateral aspect right seventh rib.  Skin chest report for additional details from May 27, 2019 at 1546 hours        Discharge Exam:  General Appearance:    Alert, cooperative, no distress, appears stated age   Lungs:     Clear to auscultation bilaterally, respirations unlabored    Heart:    Regular rate and rhythm, S1 and S2 normal, no murmur, rub   or gallop   Abdomen:     Soft, non-tender, bowel sounds active all four quadrants,     no masses, no organomegaly   Extremities:   Extremities normal, atraumatic, no cyanosis or edema   Pulses:   2+ and symmetric all extremities   Neurologic:   CNII-XII intact, normal strength, sensation and reflexes     throughout     In process/preliminary results:  Outstanding Order Results     Date and Time Order Name Status Description    5/28/2019 1220 XR CHEST PORTABLE In process     5/28/2019 0820 XR CHEST STANDARD (2 VW) In process     5/27/2019 1626 Urine Culture In process     5/27/2019 1551 XR PELVIS (1-2 VIEWS) Preliminary           Discharge medications     Medication List      START taking these medications    acetaminophen 500 MG tablet  Commonly known as:  TYLENOL  Take 1 tablet by mouth every 8 hours for 5 days     traMADol 50 MG tablet  Commonly known as:  ULTRAM  Take 1 tablet by mouth every 6 hours as needed for Pain for up to 3 days. CONTINUE taking these medications    esomeprazole Magnesium 40 MG Pack  Commonly known as:  NEXIUM     metFORMIN 500 MG tablet  Commonly known as:  GLUCOPHAGE     valsartan 80 MG tablet  Commonly known as:  DIOVAN           Where to Get Your Medications      You can get these medications from any pharmacy    Bring a paper prescription for each of these medications  · traMADol 50 MG tablet  You don't need a prescription for these medications  · acetaminophen 500 MG tablet           Patient Instructions:   Discharge Medication List as of 5/28/2019  1:56 PM      START taking these medications    Details   acetaminophen (TYLENOL) 500 MG tablet Take 1 tablet by mouth every 8 hours for 5 days, Disp-15 tablet, [id number]      traMADol (ULTRAM) 50 MG tablet Take 1 tablet by mouth every 6 hours as needed for Pain for up to 3 days. , Disp-10 tablet, R-0Print         CONTINUE these medications which have NOT CHANGED    Details   metFORMIN (GLUCOPHAGE) 500 MG tablet Take 500 mg by mouth daily (with breakfast)Historical Med      esomeprazole Magnesium (NEXIUM) 40 MG PACK Take 40 mg by mouth daily      valsartan (DIOVAN) 80 MG tablet Take 80 mg by mouth daily           Activity: activity as tolerated  Diet: regular diet and diabetic diet ( if indicated)    I spent more than 30 minutes talking to the patient, family and the nurse and preparing the discharge      Signed:  Nadja Zepeda MD  Pager : [phone]  5/28/2019  3:35 PM

## 2019-05-28 NOTE — CONSULTS
Consult to Pulmonology  Consult performed by: Annie Lay MD  Consult ordered by: Sinai Morris MD        Pulmonary Medicine  Consult Note      Reason for consultation: Pneumothorax          HISTORY OF PRESENT ILLNESS:    This is a 55-year-old gentleman with history of diabetes, hypertension, GERD, no pulmonary history in the past, who fell as his ladder slipped and went down 8-10 feet landed on his right side presented to the emergency room with chest pain worse on breathing mild shortness breath, gluteal area pain, he had extensive CT scan surveillance of spine, abdomen and chest, he had seventh and ninth rib fractures and minimal pneumothorax. Follow-up chest x-ray today showed possible remote pneumothorax in the right apex. He was transferred here for chest tube placement. An attempted midaxillary approach at the second intercostal space but no air recovered. I will obtain a follow-up chest x-ray now in an upright position and if he has improvement or no evidence of pneumothorax we will continue observation only which can be done as an outpatient          Past Medical History:        Diagnosis Date    Acid reflux     Diabetes mellitus ([address]     Hypertension     Prostatitis        Past Surgical History:        Procedure Laterality Date    CHOLECYSTECTOMY, LAPAROSCOPIC N/A 12/9/2016    CHOLECYSTECTOMY LAPAROSCOPIC WITH GRAMS POSS OPEN  performed by Aston Mckeon MD at [address] [facility]      ganglion cyst removal of right wrist    TONSILLECTOMY         Social History:     reports that he has been smoking cigars. He has never used smokeless tobacco. He reports that he drinks alcohol. He reports that he has current or past drug history. Drug: Marijuana. Family History:   History reviewed. No pertinent family history. Allergies:  Patient has no known allergies.         MEDICATIONS during current hospitalization:    Continuous Infusions:   dextrose         Scheduled Meds:   lidocaine        [id number], [id number] in the last 72 hours. O2 Device: Nasal cannula  O2 Flow Rate (L/min): 2 L/min  Lab Results   Component Value Date    ANITRA 3.6 12/05/2016       Radiology  Ct Head Wo Contrast    Result Date: 5/27/2019  EXAMINATION: CT BRAIN WITHOUT CONTRAST CLINICAL HISTORY:  8 ft fall COMPARISONS: NONE AVAILABLE TECHNIQUE: Spiral scans without contrast. Multiplanar 2-D reconstructions. All CT scans at this facility use dose modulation, iterative reconstruction, and/or weight based dosing when appropriate to reduce radiation dose to as low as reasonably achievable. FINDINGS: The brain appears normal. There are no fractures or other skull lesions. The mastoids and middle ears are clear. There is 1.5 cm retention cyst or polyp in the floor of the right maxillary sinus. There is trace polypoid mucoperiosteal thickening in the floor of the left maxillary  sinus. The paranasal sinuses are otherwise clear. The orbits are unremarkable. There is bilateral karla bullosa. NO ACUTE INTRACRANIAL PATHOLOGY. Ct Chest W Contrast    Result Date: 5/27/2019  EXAMINATION: CT CHEST W CONTRAST CLINICAL HISTORY:  blunt trauma   Fall COMPARISON: NONE AVAILABLE TECHNIQUE:  Spiral scans with bolus bolus administration of intravenous contrast. (100 ml Isovue-370). Multiplanar 2-D reconstructions. 3 x 10 mm axial 3-D maximum intensity projection reconstructions. All CT scans at this facility use dose modulation, iterative reconstruction, and/or weight based dosing when appropriate to reduce radiation dose to as low as reasonably achievable. FINDINGS: Acquisition was performed in shallow inspiration. There are nondisplaced fractures of the lateral right 7th and posterior right 9th ribs with trace concomitant extrapleural soft tissue swelling. There is trace inferomedial right pneumothorax. There is bilateral dependent lung atelectasis. There is mediastinal lipomatosis. Vascular structures are intact. There is no pericardial effusion. The esophagus is unremarkable. There is mild spondylosis. NONDISPLACED FRACTURES OF THE RIGHT 7TH AND 9TH RIBS. TRACE RIGHT PNEUMOTHORAX. Ct Cervical Spine Wo Contrast    Result Date: 5/27/2019  EXAMINATION: CT CERVICAL SPINE WO CONTRAST CLINICAL HISTORY:  fall from ladder 8 ft COMPARISONS: NONE AVAILABLE TECHNIQUE: Spiral scans without contrast. Multiplanar 2-D reconstructions. All CT scans at this facility use dose modulation, iterative reconstruction, and/or weight based dosing when appropriate to reduce radiation dose to as low as reasonably achievable. FINDINGS: There is no fracture or traumatic subluxation. There is spondylosis with multilevel marginal osteophytes, uncovertebral osteoarthrosis, and apophyseal osteoarthrosis. There is moderate narrowing of the C5-6 intervertebral disc space. There is mild narrowing of the C4-5 and C6-7 intervertebral disc spaces. There is spur encroachment on the C5-6 neural foramina. There is moderate central canal stenosis and a left paramedian disc osteophyte complex at C5-6. SPONDYLOSIS. NEGATIVE FOR FRACTURE. Ct Lumbar Spine Wo Contrast    Result Date: 5/27/2019  EXAMINATION: CT LUMBAR SPINE WO CONTRAST CLINICAL HISTORY:  fall 8 ft off ladder   Fall COMPARISON: NONE AVAILABLE TECHNIQUE:  Spiral scans with bolus bolus administration of intravenous contrast. (The contrast was administered for the CT scans of the chest, abdomen, and pelvis). Multiplanar 2-D reconstructions. All CT scans at this facility use dose modulation, iterative reconstruction, and/or weight based dosing when appropriate to reduce radiation dose to as low as reasonably achievable. FINDINGS: No fracture is identified. There is mild lumbar spondylosis with multilevel apophyseal arthrosis and vertebral marginal osteophytosis. There is moderate to marked L4-5 canal stenosis. SPONDYLOSIS WITH L4-5 CANAL STENOSIS. NEGATIVE FOR FRACTURE.     Ct Abdomen Pelvis W Iv Contrast Additional Contrast? None    Result Date: 5/27/2019  EXAMINATION: CT ABDOMEN PELVIS W IV CONTRAST CLINICAL HISTORY:  fall 8 ft off ladder   Fall COMPARISON: NONE AVAILABLE TECHNIQUE:  Spiral scans with bolus bolus administration of intravenous contrast. (100 ml Isovue-370). Multiplanar 2-D reconstructions. All CT scans at this facility use dose modulation, iterative reconstruction, and/or weight based dosing when appropriate to reduce radiation dose to as low as reasonably achievable. FINDINGS: No solid or hollow organ injury is identified. Vascular structures are intact. No fractures identified. There is subcutaneous stranding in the right buttock consistent with soft tissue contusion. There is no hemoperitoneum. There is no evidence  of retroperitoneal/extraperitoneal hemorrhage. There is hepatic steatosis. Patient is status post cholecystectomy. The bile ducts and pancreas are unremarkable. There is no urolithiasis. There is symmetric bilateral renal contrast excretion without obstructive uropathy or extravasation. The prostate is markedly enlarged. The bowel is nonspecific. The appendix is normal. There is mild sigmoid diverticulosis. There are small fat-containing inguinal hernias. There is small fat-containing umbilical hernia. There are degenerative changes in the spine. Note: This interpretation includes assessment of the liver, spleen, gallbladder, bile. Ducts, pancreas, adrenal glands, kidneys, urogenital structures, abdominal vasculature, retroperitoneum, gastrointestinal tract, mesentery, lymph nodes, inguinal regions, osseous structures, abdominopelvic walls, and visualized portions of the lower thorax. Some structures may be congenitally or surgically absent/altered. Unless otherwise indicated in this report, these organs and structures demonstrate no significant pathology or demonstrate findings which do not require additional follow-up/evaluation. RIGHT GLUTEAL CONTUSION.  OTHERWISE, NO SIGNIFICANT TRAUMATIC

## 2019-05-29 LAB
GFR AFRICAN AMERICAN: >60
GFR NON-AFRICAN AMERICAN: >60
PERFORMED ON: NORMAL
POC CREATININE: 1 MG/DL (ref 0.8–1.3)
POC SAMPLE TYPE: NORMAL
URINE CULTURE, ROUTINE: NORMAL

## 2019-05-31 ENCOUNTER — HOSPITAL ENCOUNTER (OUTPATIENT)
Dept: GENERAL RADIOLOGY | Age: 69
Discharge: HOME | End: 2019-06-02
Payer: MEDICARE

## 2019-05-31 DIAGNOSIS — J93.9 PNEUMOTHORAX ON RIGHT: ICD-10-CM

## 2019-05-31 DIAGNOSIS — S22.31XA CLOSED FRACTURE OF ONE RIB OF RIGHT SIDE, INITIAL ENCOUNTER: ICD-10-CM

## 2019-05-31 PROCEDURE — 71046 X-RAY EXAM CHEST 2 VIEWS: CPT

## 2022-08-17 ENCOUNTER — OFFICE VISIT (OUTPATIENT)
Dept: FAMILY MEDICINE CLINIC | Age: 72
End: 2022-08-17
Payer: MEDICARE

## 2022-08-17 VITALS
TEMPERATURE: 97.8 F | BODY MASS INDEX: 28.93 KG/M2 | OXYGEN SATURATION: 98 % | RESPIRATION RATE: 16 BRPM | HEART RATE: 88 BPM | SYSTOLIC BLOOD PRESSURE: 120 MMHG | DIASTOLIC BLOOD PRESSURE: 80 MMHG | WEIGHT: 180 LBS | HEIGHT: 66 IN

## 2022-08-17 DIAGNOSIS — J06.9 URI WITH COUGH AND CONGESTION: ICD-10-CM

## 2022-08-17 DIAGNOSIS — U07.1 COVID-19: Primary | ICD-10-CM

## 2022-08-17 LAB
Lab: ABNORMAL
PERFORMING INSTRUMENT: ABNORMAL
QC PASS/FAIL: ABNORMAL
SARS-COV-2, POC: DETECTED

## 2022-08-17 PROCEDURE — 87426 SARSCOV CORONAVIRUS AG IA: CPT | Performed by: NURSE PRACTITIONER

## 2022-08-17 PROCEDURE — 4004F PT TOBACCO SCREEN RCVD TLK: CPT | Performed by: NURSE PRACTITIONER

## 2022-08-17 PROCEDURE — G8427 DOCREV CUR MEDS BY ELIG CLIN: HCPCS | Performed by: NURSE PRACTITIONER

## 2022-08-17 PROCEDURE — G8417 CALC BMI ABV UP PARAM F/U: HCPCS | Performed by: NURSE PRACTITIONER

## 2022-08-17 PROCEDURE — 99213 OFFICE O/P EST LOW 20 MIN: CPT | Performed by: NURSE PRACTITIONER

## 2022-08-17 PROCEDURE — 1123F ACP DISCUSS/DSCN MKR DOCD: CPT | Performed by: NURSE PRACTITIONER

## 2022-08-17 PROCEDURE — 3017F COLORECTAL CA SCREEN DOC REV: CPT | Performed by: NURSE PRACTITIONER

## 2022-08-17 RX ORDER — DEXTROMETHORPHAN HYDROBROMIDE AND PROMETHAZINE HYDROCHLORIDE 15; 6.25 MG/5ML; MG/5ML
5 SYRUP ORAL 4 TIMES DAILY PRN
Qty: 118 ML | Refills: 0 | Status: SHIPPED | OUTPATIENT
Start: 2022-08-17

## 2022-08-17 RX ORDER — GLIMEPIRIDE 1 MG/1
TABLET ORAL
COMMUNITY
Start: 2022-07-01

## 2022-08-17 RX ORDER — PROMETHAZINE HYDROCHLORIDE AND CODEINE PHOSPHATE 6.25; 1 MG/5ML; MG/5ML
5 SYRUP ORAL 2 TIMES DAILY PRN
Qty: 70 ML | Refills: 0 | Status: SHIPPED | OUTPATIENT
Start: 2022-08-17 | End: 2022-08-24

## 2022-08-17 RX ORDER — VALSARTAN AND HYDROCHLOROTHIAZIDE 80; 12.5 MG/1; MG/1
TABLET, FILM COATED ORAL
COMMUNITY
Start: 2022-07-20

## 2022-08-17 SDOH — ECONOMIC STABILITY: TRANSPORTATION INSECURITY
IN THE PAST 12 MONTHS, HAS LACK OF TRANSPORTATION KEPT YOU FROM MEETINGS, WORK, OR FROM GETTING THINGS NEEDED FOR DAILY LIVING?: NO

## 2022-08-17 SDOH — ECONOMIC STABILITY: TRANSPORTATION INSECURITY
IN THE PAST 12 MONTHS, HAS THE LACK OF TRANSPORTATION KEPT YOU FROM MEDICAL APPOINTMENTS OR FROM GETTING MEDICATIONS?: NO

## 2022-08-17 SDOH — ECONOMIC STABILITY: FOOD INSECURITY: WITHIN THE PAST 12 MONTHS, THE FOOD YOU BOUGHT JUST DIDN'T LAST AND YOU DIDN'T HAVE MONEY TO GET MORE.: NEVER TRUE

## 2022-08-17 SDOH — ECONOMIC STABILITY: FOOD INSECURITY: WITHIN THE PAST 12 MONTHS, YOU WORRIED THAT YOUR FOOD WOULD RUN OUT BEFORE YOU GOT MONEY TO BUY MORE.: NEVER TRUE

## 2022-08-17 ASSESSMENT — SOCIAL DETERMINANTS OF HEALTH (SDOH): HOW HARD IS IT FOR YOU TO PAY FOR THE VERY BASICS LIKE FOOD, HOUSING, MEDICAL CARE, AND HEATING?: NOT HARD AT ALL

## 2022-08-17 ASSESSMENT — ENCOUNTER SYMPTOMS
DIARRHEA: 1
SHORTNESS OF BREATH: 0
RHINORRHEA: 0
ABDOMINAL PAIN: 1
SORE THROAT: 1
WHEEZING: 1
TROUBLE SWALLOWING: 0
COUGH: 1
VOMITING: 0
NAUSEA: 0

## 2022-08-17 NOTE — PROGRESS NOTES
Subjective:      Patient ID: Julio Bear is a 70 y.o. male who presents today for:  Chief Complaint   Patient presents with    Concern For COVID-19     Tested + at home- cough started Monday- family in household is +       HPI    Entire family with covid   Started feeling bad Monday   Scratchy throat Monday evening   Yesterday as day went on deeper cough   Couldn't sleep last night   Today he tested positive at home   Abd pain a little   A little diarrhea yesterday   He is vaccinated   He has never had covid before   Taking tylenol   He does not have asthma or COPD           Past Medical History:   Diagnosis Date    Acid reflux     [facility] mellitus [address] [address]     Hypertension     Prostatitis      Past Surgical History:   Procedure Laterality Date    CHOLECYSTECTOMY, LAPAROSCOPIC N/A 12/9/2016    CHOLECYSTECTOMY LAPAROSCOPIC WITH GRAMS POSS OPEN  performed by Vini Hackett MD at [address] [facility]      ganglion cyst removal of right wrist    TONSILLECTOMY       Social History     Socioeconomic History    Marital status:      Spouse name: Not on file    Number of children: Not on file    Years of education: Not on file    Highest education level: Not on file   Occupational History    Not on file   Tobacco Use    Smoking status: Light Smoker     Types: Cigars    Smokeless tobacco: Never    Tobacco comments:     occassional cigar   Vaping Use    Vaping Use: Never used   Substance and Sexual Activity    Alcohol use: Yes     Comment: Switches between a beer a day and an NA a day    Drug use: Yes     Types: Marijuana Paulina Coil)    Sexual activity: Not on file   Other Topics Concern    Not on file   Social History Narrative    Not on file     Social Determinants of Health     Financial Resource Strain: Low Risk     Difficulty of Paying Living Expenses: Not hard at all   Food Insecurity: No Food Insecurity    Worried About [address] in the Last Year: Never true    [employer] in the Last Year: Never true   Transportation Needs: No Transportation Needs    Lack of Transportation (Medical): No    Lack of Transportation (Non-Medical): No   Physical Activity: Not on file   Stress: Not on file   Social Connections: Not on file   Intimate Partner Violence: Not on file   Housing Stability: Not on file     History reviewed. No pertinent family history. Allergies   Allergen Reactions    Metformin Diarrhea     Current Outpatient Medications   Medication Sig Dispense Refill    molnupiravir 200 MG capsule Take 4 capsules by mouth in the morning and 4 capsules in the evening. Do all this for 5 days. 40 capsule 0    promethazine-dextromethorphan (PROMETHAZINE-DM) 6.25-15 MG/5ML syrup Take 5 mLs by mouth 4 times daily as needed for Cough 118 mL 0    promethazine-codeine (PHENERGAN WITH CODEINE) 6.25-10 MG/5ML syrup Take 5 mLs by mouth 2 times daily as needed for Cough for up to 7 days. 70 mL 0    metFORMIN (GLUCOPHAGE) 500 MG tablet Take 500 mg by mouth daily (with breakfast)      esomeprazole Magnesium (NEXIUM) 40 MG PACK Take 40 mg by mouth daily      glimepiride (AMARYL) 1 MG tablet       valsartan-hydroCHLOROthiazide (DIOVAN-HCT) 80-12.5 MG per tablet       acetaminophen (TYLENOL) 500 MG tablet Take 1 tablet by mouth every 8 hours for 5 days 15 tablet 0    valsartan (DIOVAN) 80 MG tablet Take 80 mg by mouth daily (Patient not taking: Reported on 8/17/2022)       No current facility-administered medications for this visit. Review of Systems   Constitutional:  Negative for appetite change, chills, fatigue and fever. HENT:  Positive for congestion, ear discharge and sore throat (scratchy). Negative for rhinorrhea and trouble swallowing. Respiratory:  Positive for cough and wheezing (a little when in bed). Negative for shortness of breath. Gastrointestinal:  Positive for abdominal pain and diarrhea. Negative for nausea and vomiting.    Musculoskeletal:  Positive for myalgias. Skin:  Negative for rash. Neurological:  Positive for headaches (a little). Negative for dizziness and light-headedness. Psychiatric/Behavioral:  Negative for agitation, confusion and hallucinations. Objective:   /80 (Site: Right Upper Arm, Position: Sitting, Cuff Size: Medium Adult)   Pulse 88   Temp 97.8 °F (36.6 °C) (Temporal)   Resp 16   Ht 5' 6\" (1.676 m)   Wt 180 lb (81.6 kg)   SpO2 98%   BMI 29.05 kg/m²     Physical Exam  Vitals reviewed. Constitutional:       Appearance: Normal appearance. HENT:      Head: Normocephalic and atraumatic. Right Ear: Hearing, tympanic membrane, ear canal and external ear normal. No middle ear effusion. No foreign body. Tympanic membrane is not injected, erythematous or bulging. Left Ear: Hearing, tympanic membrane, ear canal and external ear normal.  No middle ear effusion. No foreign body. Tympanic membrane is not injected, erythematous or bulging. Nose: Nose normal. No congestion or rhinorrhea. Right Nostril: No foreign body. Left Nostril: No foreign body. Right Turbinates: Not enlarged. Left Turbinates: Not enlarged. Right Sinus: No maxillary sinus tenderness or frontal sinus tenderness. Left Sinus: No maxillary sinus tenderness or frontal sinus tenderness. Mouth/Throat:      Lips: Pink. Mouth: Mucous membranes are moist.      Pharynx: Oropharynx is clear. Uvula midline. No pharyngeal swelling, oropharyngeal exudate, posterior oropharyngeal erythema or uvula swelling. Tonsils: No tonsillar exudate or tonsillar abscesses. Eyes:      General: Lids are normal.         Right eye: No foreign body. Left eye: No foreign body. Extraocular Movements: Extraocular movements intact. Conjunctiva/sclera: Conjunctivae normal.   Cardiovascular:      Rate and Rhythm: Normal rate and regular rhythm. Heart sounds: Normal heart sounds.    Pulmonary:      Effort: Pulmonary effort is normal. No tachypnea, accessory muscle usage or respiratory distress. Breath sounds: Normal breath sounds. No wheezing or rhonchi. Comments: Harsh cough  Chest:   Breasts:     Right: No supraclavicular adenopathy. Left: No supraclavicular adenopathy. Abdominal:      Tenderness: There is no abdominal tenderness. There is no guarding. Musculoskeletal:         General: Normal range of motion. Cervical back: Normal range of motion. Lymphadenopathy:      Cervical: No cervical adenopathy. Upper Body:      Right upper body: No supraclavicular adenopathy. Left upper body: No supraclavicular adenopathy. Skin:     General: Skin is warm and dry. Capillary Refill: Capillary refill takes less than 2 seconds. Neurological:      General: No focal deficit present. Mental Status: He is alert and oriented to person, place, and time. Cranial Nerves: Cranial nerves are intact. Gait: Gait is intact. Psychiatric:         Mood and Affect: Mood normal.         Speech: Speech normal.         Behavior: Behavior normal. Behavior is cooperative. Thought Content: Thought content normal.         Judgment: Judgment normal.       Assessment:       Diagnosis Orders   1. COVID-19  molnupiravir 200 MG capsule    promethazine-codeine (PHENERGAN WITH CODEINE) 6.25-10 MG/5ML syrup      2. URI with cough and congestion  POCT COVID-19, Antigen    promethazine-dextromethorphan (PROMETHAZINE-DM) 6.25-15 MG/5ML syrup        No results found for this visit on 08/17/22. Plan:   Return if symptoms worsen or fail to improve. Will need quarantine until symptom improvement or 5 days from of onset of symptoms. Discussed OTC comfort care. Discussed hydration and self proning for coughing or SOB. Pt to f/u if not improving as expected or to Er if symptoms severe. Spent much time educating the patient on COVID and answering questions.        For symptomatic immunocompetent patients with mild disease who are cared for at home, isolation can usually be discontinued when the following criteria are met:  ? At least five days have passed since symptoms first appeared AND  ? At least one day (24 hours) has passed since resolution of fever without the use of fever-reducing medications AND   ? There is improvement in symptoms (eg, cough, shortness of breath)  After discontinuing home isolation, patients should continue to wear a well-fitting mask around others. The total duration of isolation plus strict masking is 10 days. During this time, patients should avoid people who are immunocompromised or at high risk for severe disease. Additional information on restrictions (eg, travel) after the five-day isolation period can be found on the [facility] website. Assessment & Plan   Yancy Jaramillo was seen today for concern for covid-19. Diagnoses and all orders for this visit:    COVID-19  -     molnupiravir 200 MG capsule; Take 4 capsules by mouth in the morning and 4 capsules in the evening. Do all this for 5 days. -     promethazine-codeine (PHENERGAN WITH CODEINE) 6.25-10 MG/5ML syrup; Take 5 mLs by mouth 2 times daily as needed for Cough for up to 7 days. URI with cough and congestion  -     POCT COVID-19, Antigen  -     promethazine-dextromethorphan (PROMETHAZINE-DM) 6.25-15 MG/5ML syrup; Take 5 mLs by mouth 4 times daily as needed for Cough    Orders Placed This Encounter   Procedures    POCT COVID-19, Antigen     Order Specific Question:   Is this test for diagnosis or screening? Answer:   Diagnosis of ill patient     Order Specific Question:   Symptomatic for COVID-19 as defined by CDC? Answer:   Yes     Order Specific Question:   Date of Symptom Onset     Answer:   8/15/2022     Order Specific Question:   Hospitalized for COVID-19? Answer:   No     Order Specific Question:   Admitted to ICU for COVID-19?      Answer:   No     Order Specific Question:   Employed in healthcare setting? Answer:   No     Order Specific Question:   Resident in a congregate (group) care setting? Answer:   No     Order Specific Question:   Pregnant: Answer:   No     Orders Placed This Encounter   Medications    molnupiravir 200 MG capsule     Sig: Take 4 capsules by mouth in the morning and 4 capsules in the evening. Do all this for 5 days. Dispense:  40 capsule     Refill:  0     Order Specific Question:   Does this patient qualify for COVID-19 antIviral therapy based on criteria for treatment? Answer: Yes    promethazine-dextromethorphan (PROMETHAZINE-DM) 6.25-15 MG/5ML syrup     Sig: Take 5 mLs by mouth 4 times daily as needed for Cough     Dispense:  118 mL     Refill:  0    promethazine-codeine (PHENERGAN WITH CODEINE) 6.25-10 MG/5ML syrup     Sig: Take 5 mLs by mouth 2 times daily as needed for Cough for up to 7 days. Dispense:  70 mL     Refill:  0     Reduce doses taken as pain becomes manageable       There are no discontinued medications. Return if symptoms worsen or fail to improve. Reviewed with the patient/family: current clinical status & medications. Side effects of the medication prescribed today, as well as treatment plan/rationale and result expectations have been discussed with the patient/family who expresses understanding. Patient will be discharged home in stable condition. Follow up with PCP to evaluate treatment results or return if symptoms worsen or fail to improve. Discussed signs and symptoms which require immediate follow-up in ED/call to 911. Understanding verbalized. I have reviewed the patient's medical history in detail and updated the computerized patient record.     Tony Dowell, APRN - CNP